# Patient Record
Sex: MALE | Race: BLACK OR AFRICAN AMERICAN
[De-identification: names, ages, dates, MRNs, and addresses within clinical notes are randomized per-mention and may not be internally consistent; named-entity substitution may affect disease eponyms.]

---

## 2019-07-22 PROBLEM — Z00.00 ENCOUNTER FOR PREVENTIVE HEALTH EXAMINATION: Status: ACTIVE | Noted: 2019-07-22

## 2019-08-27 ENCOUNTER — APPOINTMENT (OUTPATIENT)
Dept: ENDOCRINOLOGY | Facility: CLINIC | Age: 29
End: 2019-08-27
Payer: MEDICAID

## 2019-08-27 VITALS
BODY MASS INDEX: 27.65 KG/M2 | SYSTOLIC BLOOD PRESSURE: 128 MMHG | HEART RATE: 56 BPM | HEIGHT: 78 IN | WEIGHT: 239 LBS | DIASTOLIC BLOOD PRESSURE: 75 MMHG

## 2019-08-27 DIAGNOSIS — R68.89 OTHER GENERAL SYMPTOMS AND SIGNS: ICD-10-CM

## 2019-08-27 PROCEDURE — 99203 OFFICE O/P NEW LOW 30 MIN: CPT

## 2019-08-28 NOTE — END OF VISIT
[FreeTextEntry3] : All medical record entries made by the Scribe were at my, Dr. Reji Nelson, direction and personally dictated by me on 08/27/2019. I have reviewed the chart and agree that the record accurately reflects my personal performance of the history, physical exam, assessment and plan. I have also personally directed, reviewed and agreed with the chart.  [>50% of Time Spent on Counseling for ____] : Greater than 50% of the encounter time was spent on counseling for [unfilled] [Time Spent: ___ minutes] : I have spent [unfilled] minutes of face to face time with the patient

## 2019-08-28 NOTE — ADDENDUM
[FreeTextEntry1] : I, Raven Holman, acted solely as a scribe for Dr. Reji Nelson on this date. 08/27/2019.

## 2019-08-28 NOTE — REVIEW OF SYSTEMS
[As Noted in HPI] : as noted in HPI [Dizziness] : dizziness [Negative] : Endocrine [Palpitations] : no palpitations [Diarrhea] : no diarrhea [Polyuria] : no polyuria [Decreased Libido] : no decreased libido [Headache] : no headaches [de-identified] : CVG

## 2019-08-28 NOTE — PHYSICAL EXAM
[Alert] : alert [Normal Sclera/Conjunctiva] : normal sclera/conjunctiva [Normal Outer Ear/Nose] : the ears and nose were normal in appearance [No Respiratory Distress] : no respiratory distress [Clear to Auscultation] : lungs were clear to auscultation bilaterally [Normal Rate] : heart rate was normal  [Normal S1, S2] : normal S1 and S2 [No Edema] : there was no peripheral edema [Normal Bowel Sounds] : normal bowel sounds [Spine Straight] : spine straight [No Stigmata of Cushings Syndrome] : no stigmata of cushings syndrome [Normal Gait] : normal gait [No Rash] : no rash [Normal Reflexes] : deep tendon reflexes were 2+ and symmetric [Oriented x3] : oriented to person, place, and time [de-identified] : thyroid glands not palpated, no tenderness

## 2019-08-28 NOTE — HISTORY OF PRESENT ILLNESS
[FreeTextEntry1] : 30 y/o male pt, self- referred, presents today for "hormone imbalance" and establish endocrine care with me.\par Other PMHx: Cutis Verticis Gyrata (at age 16, as per pt)\par PSHx: tonsillectomy (in 2017), deviated septum (in 2016)\par FHx: liver issues (father)\par Denies FHx of DM, thyroid disease, bone disease \par SHx: no tobacco use, social etOH use, marijuana use occasionally; works as a realtor \par Physically active\par \par Pt states when he hit puberty he developed "fatty deposits" on his head at age 16. He was recently told it was called "CVG (Cutis Verticis Gyrata " by Dermatologist. He notes he started diets and working out more in order to get rid of it. Pt states he was told by an acupuncturist that "his hormone was imbalanced at the age 16 and that was the cause of it."\par \par Today pt presents feeling well, with no major physical complaints. He notes he occasionally gets dizzy 2/2 the diet he is on. Pt states he gains weight easily when he does not exercise, and experiences weight fluctuation 2/2 his diet. \par Denies poor stamina performance, headaches, polyuria, diarrhea, and palpitations. \par \par Current Medications: Zyrtec

## 2019-08-28 NOTE — ASSESSMENT
[FreeTextEntry1] : 30 y/o M pt with:\par 1. Hx of weight fluctuations:\par Pt appears to be in good health with c/o weight fluctuations. Will send for metabolic profile and TSH. Will contact pt with results, however, no f/u is needed. \par \par

## 2019-09-05 LAB
ALBUMIN SERPL ELPH-MCNC: 4.6 G/DL
ALP BLD-CCNC: 40 U/L
ALT SERPL-CCNC: 31 U/L
ANION GAP SERPL CALC-SCNC: 13 MMOL/L
AST SERPL-CCNC: 36 U/L
BILIRUB SERPL-MCNC: 0.8 MG/DL
BUN SERPL-MCNC: 16 MG/DL
CALCIUM SERPL-MCNC: 9.9 MG/DL
CHLORIDE SERPL-SCNC: 101 MMOL/L
CHOLEST SERPL-MCNC: 172 MG/DL
CHOLEST/HDLC SERPL: 2.4 RATIO
CO2 SERPL-SCNC: 27 MMOL/L
CREAT SERPL-MCNC: 1.16 MG/DL
ESTIMATED AVERAGE GLUCOSE: 105 MG/DL
GLUCOSE SERPL-MCNC: 80 MG/DL
HBA1C MFR BLD HPLC: 5.3 %
HDLC SERPL-MCNC: 73 MG/DL
LDLC SERPL CALC-MCNC: 85 MG/DL
POTASSIUM SERPL-SCNC: 4.4 MMOL/L
PROT SERPL-MCNC: 7.6 G/DL
SODIUM SERPL-SCNC: 141 MMOL/L
TRIGL SERPL-MCNC: 72 MG/DL
TSH SERPL-ACNC: 2.78 UIU/ML